# Patient Record
Sex: FEMALE | Race: WHITE
[De-identification: names, ages, dates, MRNs, and addresses within clinical notes are randomized per-mention and may not be internally consistent; named-entity substitution may affect disease eponyms.]

---

## 2023-08-02 ENCOUNTER — HOSPITAL ENCOUNTER
Dept: HOSPITAL 101 - RAD | Age: 53
Discharge: HOME | End: 2023-08-02
Payer: COMMERCIAL

## 2023-08-02 DIAGNOSIS — M77.32: ICD-10-CM

## 2023-08-02 DIAGNOSIS — M79.671: Primary | ICD-10-CM

## 2023-08-02 DIAGNOSIS — M77.31: ICD-10-CM

## 2023-08-02 DIAGNOSIS — M79.672: ICD-10-CM

## 2023-08-02 PROCEDURE — 73630 X-RAY EXAM OF FOOT: CPT

## 2023-08-02 NOTE — XR_ITS
[address] [address] 
     [phone] 
  
  
Patient Name: 
DAVID PORRAS 
  
MRN: [id number]    YOB: 1970    Sex: F 
Assigned Patient Location: RAD 
Current Patient Location: [facility] 
Accession/Order Number: [id number] 
Exam Date: 8/02/2023  09:30    Report Date: 8/02/2023  10:37 
  
At the request of: 
GLORIA WOOTEN   
  
Procedure:  XR foot NICKIE min 3V 
  
EXAMINATION: XR foot NICKIE min 3V  
  
HISTORY: BILATERAL FOOT PAIN 
  
COMPARISON: No relevant comparison available.  
  
FINDINGS:  
  
RIGHT FINDINGS: 
BONES: No acute fracture or dislocation. Minimal plantar enthesopathic  
spurring  
of the calcaneus  
SOFT TISSUES: Negative. No visible soft tissue swelling.  
OTHER: Negative.  
  
LEFT FINDINGS: 
BONES: No acute fracture or dislocation. Minimal enthesopathic spurring of the  
  
calcaneus at the Achilles insertion  
SOFT TISSUES: Negative. No visible soft tissue swelling.  
OTHER: Negative.  
  
ORDER #: [id number] XR/XR foot NICKIE min 3V  
IMPRESSION:   
   
RIGHT CONCLUSION: Minimal enthesopathic changes of the calcaneus  
LEFT CONCLUSION: Minimal enthesopathic changes of the calcaneus  
   
   
Electronically authenticated by: YUMIKO MYERS   Date: 8/02/2023  10:37

## 2023-08-18 ENCOUNTER — HOSPITAL ENCOUNTER
Age: 53
Discharge: HOME | End: 2023-08-18
Payer: COMMERCIAL

## 2023-08-18 DIAGNOSIS — I83.813: Primary | ICD-10-CM

## 2023-08-18 PROCEDURE — G0463 HOSPITAL OUTPT CLINIC VISIT: HCPCS

## 2023-08-18 PROCEDURE — 93970 EXTREMITY STUDY: CPT

## 2023-08-18 NOTE — VEIN_ITS
Patient:     DAVID PORRAS     Exam Date:     2023 
:     1970          Gender:F     MRN:     [id number] 
Ordering :     DR. Win DUTTAPAMANDA     Admission #:      
[id number] 
Family :          Order #:     [id number] 
     CLICK HERE TO VIEW EXAM 
  
RADIOLOGY REPORT 
  
PROCEDURE:      FACILITY [facility] COMPREHENSIVE 
  
VEIN CENTER - OFFICE VISIT INITIAL 
  
COMPARISON: None. 
  
PROGRESS NOTES: 
  
53-year-old female who presents with 20 year history of lower extremity pain  
swelling and varicose veins.  The patient complains of discolored veins,  
muscle cramps and subcutaneous edema.  The patient's has bilateral symptoms,  
right greater than left.  The patient describes the pain as aching and burning  
occasionally sharp rating the pain as an 8 on a scale of 1-10.  The patient's  
symptoms are progressed by prolonged sitting and standing worse over the last  
2 years.  The patient's symptoms are partially relieved by rest, leg elevation  
and compression stockings.  The patient has previously had injection  
sclerotherapy for spider veins.  The patient is referred by Dr. Baker for  
bilateral foot pain and edema. The patient denies any signs and symptoms to  
suggest arterial ischemia. 
  
The patient describes a family history significant for hypertension, cancer  
and heart disease in her mother and father.  Type 2 diabetes in varicose veins  
in her mother.  .  The patient has 2 daughters.  The patient has never  
smoked.  Occasional social alcohol use.  No illicit drug use.  No history of  
deep venous thrombus or pulmonary embolus.  
  
See separate history and physical for medication list.  The patient has worn  
compression stockings for approximately 2 years, on and off with some relief.   
Nursing notes were reviewed. 
  
After history and physical exam I discussed at length the pathophysiology of  
venous hypertension and possible treatments, therapies and strategies  
available. We discussed at length the importance of elevating the lower  
extremities above the level of the heart, increased physical activity and  
compression stocking use for relief of her subcutaneous edema.  The patient  
has mild upper thigh venous insufficiency which I do not think is resulting in  
her distal lower extremity swelling. 
  
Ultrasound venous reflux study performed the same day was discussed at length  
with the patient. The report demonstrates mild bilateral great saphenous vein  
insufficiency, mild right leg incompetent varicose veins.   
  
  
PHYSICAL EXAM: 
  
The right leg demonstrates mild diffuse spider veins.  No significant varicose  
or reticular veins.  Mild subcutaneous edema of the distal lower leg and  
ankle.  No significant hemosiderin staining.    
  
The left leg demonstrates mild diffuse spider veins.  No significant varicose  
or reticular veins.  Mild subcutaneous edema of the distal lower leg and  
ankle.  No significant hemosiderin staining.    
  
Both thighs, legs and feet were symmetrically warm to the touch. Good  
posterior tibial and dorsalis pedis pulses were present bilaterally. 
  
ORDER #: [id number] VEIN/VC Facility EST Comprehensive  
IMPRESSION:  
   
1.  Mild bilateral proximal great saphenous vein venous insufficiency   
2.  Mild right leg lower extremity varicose veins  
3.  Mild bilateral distal lower extremity subcutaneous edema  
4. No definite flow significant arterial disease  
5. CEAP: C2, Ep, As, Pr  
   
   
PLAN:  
1. Follow-up in 12 months  
2. Elevated legs, 20-30 mm bilateral knee high compression stockings and   
increased physical activity for symptomatic relief  
3. Self pay sclerotherapy if desired  
   
   
Nurse notes, history and physical were reviewed and confirmed, see attached   
forms.  
The nurse was present throughout the physical exam and consultation  
   
   
   
Dictated by: Conrad Noriega MD on 2023 at 14:17       
Approved by: Conrad Noriega MD on 2023 at 15:00

## 2023-08-18 NOTE — VEIN_ITS
Patient:     DAVID PORRAS     Exam Date:     2023 
:     1970          Gender:F     MRN:     [id number] 
Ordering :     DR. Win Baker D.P.M.     Admission #:      
[id number] 
Family :          Order #:     [id number] 
     CLICK HERE TO VIEW EXAM 
  
RADIOLOGY REPORT 
  
PROCEDURE:     VC EXT VENOUS REFLUX NICKIE LMTD 
  
COMPARISON:     None. 
  
INDICATIONS:     I83.813 Pain due to varicose veins of bilateral leg veins 
  
TECHNIQUE:     Duplex imaging of the lower extremity to assess the deep and  
superficial venous system for the presence of deep or superficial venous  
incompetence and to document the location and severity of disease.  The study  
includes evaluation of the great saphenous vein (GSV), anterior accessory  
saphenous vein (AASV) and small saphenous vein (SSV).  Patient scanned in  
reverse Trendelenburg and standing. 
FINDINGS:      
RIGHT LOWER EXTREMITY: 
Saphenofemoral Junction Reflux: Yes 6.7mm 2.1 sec 
GSV:     Diam (mm)      Reflux/ Time (sec) 
Proximal Thigh     7.7     Yes   1.0 
Mid Thigh     5.5     No 
Distal Thigh     4.3     Yes   0.3 
Prox Calf     4.5     No 
Mid Calf     3.8         
Saphenopopliteal Junction Reflux:         4.5mm            
SSV:            
Proximal Calf     2.4     No    
Mid Calf     2.1     No    
AASV:            
Proximal Thigh     3.3     No    
Mid Thigh     3.6     No    
Distal Thigh              
Thrombi:     No acute or chronic thrombus visualized      
Compressibility:     Normal       
Flow:     Normal       
Preforator: 
Dist/med calf 2.6mm with 0s reflux.  
Tech Note: Incompetent GSV. Patent varicose vein dist/med calf 2.3mm with 0.5s  
reflux. Patent varicose vein prox/med calf 3.8mm with 1.3s reflux. Patent  
varicose vein dist/med thigh 3.4mm with 0.5s reflux. Patent varicose vein  
mid/med 3.7mm with 0s reflux.  
  
  
  
  
LEFT LOWER EXTREMITY:  
Saphenofemoral Junction Reflux: Yes 6.8 mm 1.4 sec 
GSV:     Diam (mm)     Reflux/Time (sec) 
Proximal  Thigh     6.4       Yes    1.1 
Mid   Thigh     4.9     Yes    0.8  
Distal  Thigh     4.4     No       
Prox  Calf     3.1     No      
Mid  Calf     1.6     No     
Saphenopopliteal Junction Relux:     2.6 mm     Yes     0.8 
SSV:           
Proximal  Calf     3.0     No   
Mid  Calf     2.6           
AASV:             
Proximal  Thigh     3.4     No       
Mid  Thigh     2.2     No       
Distal  Thigh                 
Thrombi:     No acute or chronic thrombus visualized      
Compressibility:     Normal       
Flow:     Normal       
: 
No perforators visualized 
Tech Note: Incompetent GSV. Patent varicose vein prox/med calf 2.0mm with 0s  
reflux. Patent varicose vein dist/med calf 3.3mm with 0s reflux.  
  
  
  
CONCLUSION:  
1. Mild bilateral great saphenous vein venous insufficiency 
2. Mild right leg incompetent varicose veins 
  
  
Dictated by: Conrad Noriega MD on 2023 at 08:58      
Approved by: Conrad Noriega MD on 2023 at 10:21

## 2024-03-26 ENCOUNTER — OFFICE VISIT (OUTPATIENT)
Dept: FAMILY MEDICINE CLINIC | Age: 54
End: 2024-03-26
Payer: COMMERCIAL

## 2024-03-26 VITALS
OXYGEN SATURATION: 97 % | SYSTOLIC BLOOD PRESSURE: 136 MMHG | TEMPERATURE: 97.3 F | HEART RATE: 83 BPM | DIASTOLIC BLOOD PRESSURE: 82 MMHG | WEIGHT: 192 LBS | HEIGHT: 68 IN | BODY MASS INDEX: 29.1 KG/M2

## 2024-03-26 DIAGNOSIS — Z00.00 ENCOUNTER FOR ROUTINE ADULT HEALTH EXAMINATION WITHOUT ABNORMAL FINDINGS: Primary | ICD-10-CM

## 2024-03-26 PROCEDURE — 99396 PREV VISIT EST AGE 40-64: CPT | Performed by: FAMILY MEDICINE

## 2024-03-26 PROCEDURE — G8484 FLU IMMUNIZE NO ADMIN: HCPCS | Performed by: FAMILY MEDICINE

## 2024-03-26 RX ORDER — IBUPROFEN 800 MG/1
800 TABLET ORAL EVERY 6 HOURS PRN
COMMUNITY

## 2024-03-26 RX ORDER — MELOXICAM 10 MG/1
10 CAPSULE ORAL DAILY PRN
COMMUNITY

## 2024-03-26 RX ORDER — CHLORAL HYDRATE 500 MG
1 CAPSULE ORAL EVERY 24 HOURS
COMMUNITY

## 2024-03-26 RX ORDER — MULTIVIT-MIN/IRON/FOLIC ACID/K 18-600-40
CAPSULE ORAL
COMMUNITY

## 2024-03-26 SDOH — ECONOMIC STABILITY: HOUSING INSECURITY
IN THE LAST 12 MONTHS, WAS THERE A TIME WHEN YOU DID NOT HAVE A STEADY PLACE TO SLEEP OR SLEPT IN A SHELTER (INCLUDING NOW)?: NO

## 2024-03-26 SDOH — ECONOMIC STABILITY: INCOME INSECURITY: HOW HARD IS IT FOR YOU TO PAY FOR THE VERY BASICS LIKE FOOD, HOUSING, MEDICAL CARE, AND HEATING?: NOT HARD AT ALL

## 2024-03-26 SDOH — ECONOMIC STABILITY: FOOD INSECURITY: WITHIN THE PAST 12 MONTHS, YOU WORRIED THAT YOUR FOOD WOULD RUN OUT BEFORE YOU GOT MONEY TO BUY MORE.: NEVER TRUE

## 2024-03-26 SDOH — ECONOMIC STABILITY: FOOD INSECURITY: WITHIN THE PAST 12 MONTHS, THE FOOD YOU BOUGHT JUST DIDN'T LAST AND YOU DIDN'T HAVE MONEY TO GET MORE.: NEVER TRUE

## 2024-03-26 ASSESSMENT — PATIENT HEALTH QUESTIONNAIRE - PHQ9
SUM OF ALL RESPONSES TO PHQ QUESTIONS 1-9: 0
2. FEELING DOWN, DEPRESSED OR HOPELESS: NOT AT ALL
SUM OF ALL RESPONSES TO PHQ QUESTIONS 1-9: 0
SUM OF ALL RESPONSES TO PHQ9 QUESTIONS 1 & 2: 0
SUM OF ALL RESPONSES TO PHQ QUESTIONS 1-9: 0
SUM OF ALL RESPONSES TO PHQ QUESTIONS 1-9: 0
1. LITTLE INTEREST OR PLEASURE IN DOING THINGS: NOT AT ALL

## 2024-03-26 NOTE — PROGRESS NOTES
medications, activities and diet.     Side effects, adverse effects of the medication prescribed today, as well as treatment plan/ rationale and result expectations have been discussed with the patient who expresses understanding and desires to proceed.    Close follow up to evaluate treatment results and for coordination of care.  I have reviewed the patient's medical history in detail and updated the computerized patient record.    SEB FREEDMAN, DO

## 2025-01-08 ENCOUNTER — OFFICE VISIT (OUTPATIENT)
Age: 55
End: 2025-01-08

## 2025-01-08 VITALS
SYSTOLIC BLOOD PRESSURE: 128 MMHG | HEART RATE: 88 BPM | WEIGHT: 172.4 LBS | DIASTOLIC BLOOD PRESSURE: 82 MMHG | HEIGHT: 68 IN | TEMPERATURE: 97.7 F | OXYGEN SATURATION: 99 % | BODY MASS INDEX: 26.13 KG/M2

## 2025-01-08 DIAGNOSIS — Z12.11 SCREENING FOR MALIGNANT NEOPLASM OF COLON: ICD-10-CM

## 2025-01-08 DIAGNOSIS — J40 BRONCHITIS: ICD-10-CM

## 2025-01-08 DIAGNOSIS — Z00.00 ENCOUNTER FOR ROUTINE ADULT HEALTH EXAMINATION WITHOUT ABNORMAL FINDINGS: Primary | ICD-10-CM

## 2025-01-08 DIAGNOSIS — M79.10 MYALGIA: ICD-10-CM

## 2025-01-08 RX ORDER — METHYLPREDNISOLONE 4 MG/1
TABLET ORAL
Qty: 1 KIT | Refills: 0 | Status: SHIPPED | OUTPATIENT
Start: 2025-01-08

## 2025-01-08 RX ORDER — AZITHROMYCIN 250 MG/1
TABLET, FILM COATED ORAL
Qty: 6 TABLET | Refills: 0 | Status: SHIPPED | OUTPATIENT
Start: 2025-01-08 | End: 2025-01-18

## 2025-01-08 SDOH — ECONOMIC STABILITY: FOOD INSECURITY: WITHIN THE PAST 12 MONTHS, YOU WORRIED THAT YOUR FOOD WOULD RUN OUT BEFORE YOU GOT MONEY TO BUY MORE.: NEVER TRUE

## 2025-01-08 SDOH — ECONOMIC STABILITY: FOOD INSECURITY: WITHIN THE PAST 12 MONTHS, THE FOOD YOU BOUGHT JUST DIDN'T LAST AND YOU DIDN'T HAVE MONEY TO GET MORE.: NEVER TRUE

## 2025-01-08 ASSESSMENT — PATIENT HEALTH QUESTIONNAIRE - PHQ9
SUM OF ALL RESPONSES TO PHQ QUESTIONS 1-9: 0
SUM OF ALL RESPONSES TO PHQ QUESTIONS 1-9: 0
2. FEELING DOWN, DEPRESSED OR HOPELESS: NOT AT ALL
SUM OF ALL RESPONSES TO PHQ9 QUESTIONS 1 & 2: 0
SUM OF ALL RESPONSES TO PHQ QUESTIONS 1-9: 0
1. LITTLE INTEREST OR PLEASURE IN DOING THINGS: NOT AT ALL
SUM OF ALL RESPONSES TO PHQ QUESTIONS 1-9: 0

## 2025-01-08 NOTE — PROGRESS NOTES
Activity    Alcohol use: Yes     Comment: occaisonal    Drug use: Never    Sexual activity: Not on file   Other Topics Concern    Not on file   Social History Narrative    Not on file     Social Determinants of Health     Financial Resource Strain: Low Risk  (3/26/2024)    Overall Financial Resource Strain (CARDIA)     Difficulty of Paying Living Expenses: Not hard at all   Food Insecurity: No Food Insecurity (1/8/2025)    Hunger Vital Sign     Worried About Running Out of Food in the Last Year: Never true     Ran Out of Food in the Last Year: Never true   Transportation Needs: No Transportation Needs (1/8/2025)    PRAPARE - Transportation     Lack of Transportation (Medical): No     Lack of Transportation (Non-Medical): No   Physical Activity: Not on file   Stress: Not on file   Social Connections: Not on file   Intimate Partner Violence: Not on file   Housing Stability: Low Risk  (1/8/2025)    Housing Stability Vital Sign     Unable to Pay for Housing in the Last Year: No     Number of Times Moved in the Last Year: 0     Homeless in the Last Year: No     Family History   Problem Relation Age of Onset    Skin Cancer Father     Breast Cancer Maternal Aunt     Bone Cancer Maternal Aunt       Allergies   Allergen Reactions    Penicillin G      Unknown reaction - child allergy     Prior to Admission medications    Medication Sig Start Date End Date Taking? Authorizing Provider   methylPREDNISolone (MEDROL DOSEPACK) 4 MG tablet Take by mouth. 1/8/25  Yes Spencer Rodriguez DO   azithromycin (ZITHROMAX) 250 MG tablet 500mg on day 1 followed by 250mg on days 2 - 5 1/8/25 1/18/25 Yes Spencer Rodriguez DO   Omega-3 Fatty Acids (FISH OIL) 1000 MG capsule 1 capsule every 24 hours   Yes Cammie Gallego MD   vitamin D 25 MCG (1000 UT) CAPS every 24 hours   Yes Cammie Gallego MD   Ascorbic Acid (VITAMIN C) 500 MG CAPS Vitamin C   Yes Cammei Gallego MD   Multiple Vitamin (MULTI VITAMIN DAILY PO) Multi Vitamin